# Patient Record
(demographics unavailable — no encounter records)

---

## 2024-11-22 NOTE — ASSESSMENT
[FreeTextEntry1] : 13 year M complains of bilateral ear clogging here for ear cleaning.   Bilateral Cerumen Impaction: - Wax cleaned from both ears -Rx: mometasone drops to each ear once a week for ear canal scaling which may be contributing to cerumen impactions  - Follow up in 4 months as he gets severely impacted if waiting too long -he feels his hearing is back to normal so defer audio

## 2024-11-22 NOTE — PROCEDURE
[FreeTextEntry3] : After informed verbal consent is obtained, cerumen is removed from the right and left ear canal with a hook/curette and suction and colace. Removed completely bilaterally. Patient tolerated well.

## 2024-11-22 NOTE — HISTORY OF PRESENT ILLNESS
[de-identified] : Patient here with dad complains of bilateral ear clogging here for ear cleaning. He is otherwise doing okay.  [FreeTextEntry1] : He is here for f/u. His ears feel a little clogged today. He has no ear drainage, tinnitus or hearing loss. He has no other ENT complaints. He has hx of asthma and seasonal allergies

## 2024-11-22 NOTE — PHYSICAL EXAM
[Normal] : tympanic membranes are normal in both ears [de-identified] : bilateral cerumen impaction, ear canal dryness/scaling marek on left mid canal

## 2025-03-21 NOTE — ASSESSMENT
[FreeTextEntry1] : 13 year M complains of bilateral ear clogging here for ear cleaning.   Bilateral Cerumen Impaction: - Wax cleaned from both ears  - Follow up in 4 months as he gets severely impacted if waiting too long -he feels his hearing is back to normal so defer audio

## 2025-03-21 NOTE — PHYSICAL EXAM
[Normal] : tympanic membranes are normal in both ears [de-identified] : bilateral cerumen impaction

## 2025-03-21 NOTE — HISTORY OF PRESENT ILLNESS
[de-identified] : Patient here with dad complains of bilateral ear clogging here for ear cleaning. He is otherwise doing okay.  [FreeTextEntry1] : He is here for f/u. His ears feel a little clogged today. He has no ear drainage, tinnitus or hearing loss. He has no other ENT complaints. He has hx of asthma and seasonal allergies. Got some mometasone drops for the scaling which he did not use much.